# Patient Record
Sex: MALE | Race: ASIAN
[De-identification: names, ages, dates, MRNs, and addresses within clinical notes are randomized per-mention and may not be internally consistent; named-entity substitution may affect disease eponyms.]

---

## 2022-05-27 ENCOUNTER — APPOINTMENT (OUTPATIENT)
Dept: UROLOGY | Facility: CLINIC | Age: 77
End: 2022-05-27
Payer: MEDICARE

## 2022-05-27 VITALS
DIASTOLIC BLOOD PRESSURE: 72 MMHG | HEART RATE: 97 BPM | HEIGHT: 63 IN | TEMPERATURE: 97.8 F | WEIGHT: 135 LBS | BODY MASS INDEX: 23.92 KG/M2 | SYSTOLIC BLOOD PRESSURE: 155 MMHG | OXYGEN SATURATION: 96 %

## 2022-05-27 DIAGNOSIS — R39.9 UNSPECIFIED SYMPTOMS AND SIGNS INVOLVING THE GENITOURINARY SYSTEM: ICD-10-CM

## 2022-05-27 DIAGNOSIS — Z86.39 PERSONAL HISTORY OF OTHER ENDOCRINE, NUTRITIONAL AND METABOLIC DISEASE: ICD-10-CM

## 2022-05-27 PROBLEM — Z00.00 ENCOUNTER FOR PREVENTIVE HEALTH EXAMINATION: Status: ACTIVE | Noted: 2022-05-27

## 2022-05-27 PROCEDURE — 99203 OFFICE O/P NEW LOW 30 MIN: CPT

## 2022-05-27 NOTE — PHYSICAL EXAM
[General Appearance - Well Developed] : well developed [General Appearance - Well Nourished] : well nourished [Normal Appearance] : normal appearance [Well Groomed] : well groomed [General Appearance - In No Acute Distress] : no acute distress [Edema] : no peripheral edema [Respiration, Rhythm And Depth] : normal respiratory rhythm and effort [Exaggerated Use Of Accessory Muscles For Inspiration] : no accessory muscle use [Abdomen Soft] : soft [Abdomen Tenderness] : non-tender [Costovertebral Angle Tenderness] : no ~M costovertebral angle tenderness [Urethral Meatus] : meatus normal [Urinary Bladder Findings] : the bladder was normal on palpation [Scrotum] : the scrotum was normal [Epididymis] : the epididymides were normal [Testes Tenderness] : no tenderness of the testes [Testes Mass (___cm)] : there were no testicular masses [Prostate Tenderness] : the prostate was not tender [No Prostate Nodules] : no prostate nodules [Prostate Size ___ gm] : prostate size [unfilled] gm [Normal Station and Gait] : the gait and station were normal for the patient's age [] : no rash [No Focal Deficits] : no focal deficits [Oriented To Time, Place, And Person] : oriented to person, place, and time [Affect] : the affect was normal [Mood] : the mood was normal [Not Anxious] : not anxious [No Palpable Adenopathy] : no palpable adenopathy

## 2022-05-31 PROBLEM — Z86.39 HISTORY OF TYPE 2 DIABETES MELLITUS: Status: RESOLVED | Noted: 2022-05-31 | Resolved: 2022-05-31

## 2022-05-31 PROBLEM — Z86.39 HISTORY OF HYPERLIPIDEMIA: Status: RESOLVED | Noted: 2022-05-31 | Resolved: 2022-05-31

## 2022-05-31 NOTE — HISTORY OF PRESENT ILLNESS
[FreeTextEntry1] : 77 yo Khmer speaking M presents with longstanding history of LUTS for many years\par Voiding every 1-2 hours, nocturia every hour\par No straining but urinary hesitancy, incomplete bladder emptying\par Strong urgency but small voids\par no dysuria, no gross hematuria\par no UTI history\par Drinks 4 cups of water, 1 cup of coffee at 1AM to help him urinate\par Has seen multiple urologists, most recently 2 days ago (Jason) who prescribed Gemtesa\par Pt didn't take it since insurance wouldn't cover med\par Has also taken myrbetriq and vesicare in the past for extended periods with no efficacy\par The only med he has taken consistently for many years - doxazosin 4mg\par He has also been prescribed Xanax to help with his sleeping many years ago - states that without, he gets no sleep\par Has had multiple cystos - all normal, per pt\par UDS many years ago\par normal bowel movements

## 2022-05-31 NOTE — ASSESSMENT
[FreeTextEntry1] : 75 yo M with LUTS\par \par - Bladder scan 30ml. Unable to give sample stating that usually in doctor's office, unable to\par - Discussed possible etiologies for LUTS. Discussed ways to manage them including behavioral modifications such as adequate hydration, controlling constipation, restricting fluids in the evening\par - Reviewed how given secondline therapies have failed, should consider thirdline therapies\par - Also discussed behavioral modifications, and bladder training with pelvic floor PT. Referral provided\par - Pt will consider options and should he decide to proceed with thirdline therapies, will follow-up with our pelvic floor colleagues (Dr. Taj Brown).

## 2022-06-10 ENCOUNTER — TRANSCRIPTION ENCOUNTER (OUTPATIENT)
Age: 77
End: 2022-06-10

## 2023-01-26 ENCOUNTER — APPOINTMENT (OUTPATIENT)
Dept: UROLOGY | Facility: CLINIC | Age: 78
End: 2023-01-26